# Patient Record
Sex: FEMALE | Race: WHITE | NOT HISPANIC OR LATINO | Employment: OTHER | URBAN - METROPOLITAN AREA
[De-identification: names, ages, dates, MRNs, and addresses within clinical notes are randomized per-mention and may not be internally consistent; named-entity substitution may affect disease eponyms.]

---

## 2017-01-10 ENCOUNTER — HOSPITAL ENCOUNTER (OUTPATIENT)
Dept: URGENT CARE | Facility: CLINIC | Age: 77
Discharge: HOME | End: 2017-01-10
Attending: FAMILY MEDICINE | Admitting: FAMILY MEDICINE

## 2017-05-23 ENCOUNTER — HOSPITAL ENCOUNTER (OUTPATIENT)
Dept: MRI IMAGING | Facility: HOSPITAL | Age: 77
Discharge: HOME | End: 2017-05-23
Attending: PODIATRIST | Admitting: PODIATRIST

## 2017-06-26 ENCOUNTER — HOSPITAL ENCOUNTER (OUTPATIENT)
Dept: MRI IMAGING | Facility: HOSPITAL | Age: 77
Discharge: HOME | End: 2017-06-26
Attending: PODIATRIST | Admitting: PODIATRIST

## 2017-12-04 ENCOUNTER — HOSPITAL ENCOUNTER (OUTPATIENT)
Dept: PHYSICAL THERAPY | Facility: HOSPITAL | Age: 77
Setting detail: RECURRING SERIES
Discharge: HOME | End: 2017-12-31
Attending: ORTHOPAEDIC SURGERY | Admitting: ORTHOPAEDIC SURGERY

## 2018-01-01 ENCOUNTER — HOSPITAL ENCOUNTER (OUTPATIENT)
Dept: PHYSICAL THERAPY | Facility: HOSPITAL | Age: 78
Setting detail: RECURRING SERIES
Discharge: HOME | End: 2018-03-06
Attending: ORTHOPAEDIC SURGERY | Admitting: ORTHOPAEDIC SURGERY

## 2018-06-15 ENCOUNTER — HOSPITAL ENCOUNTER (OUTPATIENT)
Dept: PREADMISSION TESTING | Facility: HOSPITAL | Age: 78
Discharge: HOME | End: 2018-06-15
Attending: ORTHOPAEDIC SURGERY | Admitting: ORTHOPAEDIC SURGERY

## 2018-06-15 LAB
ALBUMIN SERPL-MCNC: 4 G/DL (ref 3.5–4.8)
ALBUMIN/GLOB SERPL: 1.3 {RATIO} (ref 1–1.7)
ALP SERPL-CCNC: 79 IU/L (ref 32–91)
ALT SERPL-CCNC: 30 IU/L (ref 14–54)
ANION GAP SERPL CALC-SCNC: 13.7 MMOL/L (ref 10–20)
AST SERPL-CCNC: 30 IU/L (ref 15–41)
BASOPHILS # BLD AUTO: 0.1 10*3/UL (ref 0–0.2)
BASOPHILS NFR BLD AUTO: 1 % (ref 0–2)
BILIRUB SERPL-MCNC: 0.7 MG/DL (ref 0.3–1.2)
BILIRUB UR QL STRIP: NEGATIVE MG/DL
BUN SERPL-MCNC: 10 MG/DL (ref 8–20)
BUN/CREAT SERPL: 10 (ref 5.4–26.2)
CALCIUM SERPL-MCNC: 9.8 MG/DL (ref 8.9–10.3)
CASTS URNS QL MICRO: NORMAL /[LPF]
CHLORIDE SERPL-SCNC: 99 MMOL/L (ref 101–111)
COLOR UR: YELLOW
CONV BACTERIA IN URINE MICRO: NEGATIVE
CONV CLARITY OF URINE: CLEAR
CONV CO2: 28 MMOL/L (ref 22–32)
CONV HYALINE CASTS IN URINE MICRO: 0 /[LPF] (ref 0–5)
CONV PROTEIN IN URINE BY AUTOMATED TEST STRIP: NEGATIVE MG/DL
CONV SMALL ROUND CELLS: NORMAL /[HPF]
CONV TOTAL PROTEIN: 7.2 G/DL (ref 6.1–7.9)
CONV UROBILINOGEN IN URINE BY AUTOMATED TEST STRIP: 0.2 MG/DL
CREAT UR-MCNC: 1 MG/DL (ref 0.4–1)
CULTURE INDICATED?: NORMAL
DIFFERENTIAL METHOD BLD: (no result)
EOSINOPHIL # BLD AUTO: 0.2 10*3/UL (ref 0–0.3)
EOSINOPHIL # BLD AUTO: 3 % (ref 0–3)
ERYTHROCYTE [DISTWIDTH] IN BLOOD BY AUTOMATED COUNT: 15.2 % (ref 11.5–14.5)
GLOBULIN UR ELPH-MCNC: 3.2 G/DL (ref 2.5–3.8)
GLUCOSE SERPL-MCNC: 118 MG/DL (ref 65–99)
GLUCOSE UR QL: NEGATIVE MG/DL
HCT VFR BLD AUTO: 35.5 % (ref 35–49)
HGB BLD-MCNC: 11.7 G/DL (ref 12–15)
HGB UR QL STRIP: NEGATIVE
KETONES UR QL STRIP: NEGATIVE MG/DL
LEUKOCYTE ESTERASE UR QL STRIP: NEGATIVE
LYMPHOCYTES # BLD AUTO: 1.6 10*3/UL (ref 0.8–4.8)
LYMPHOCYTES NFR BLD AUTO: 20 % (ref 18–42)
MCH RBC QN AUTO: 29.4 PG (ref 26–32)
MCHC RBC AUTO-ENTMCNC: 32.9 G/DL (ref 32–36)
MCV RBC AUTO: 89.4 FL (ref 80–94)
MONOCYTES # BLD AUTO: 0.5 10*3/UL (ref 0.1–1.3)
MONOCYTES NFR BLD AUTO: 6 % (ref 2–11)
NEUTROPHILS # BLD AUTO: 5.6 10*3/UL (ref 2.3–8.6)
NEUTROPHILS NFR BLD AUTO: 70 % (ref 50–75)
NITRITE UR QL STRIP: NEGATIVE
NRBC BLD AUTO-RTO: 0 /100{WBCS}
NRBC/RBC NFR BLD MANUAL: 0 10*3/UL
PH UR STRIP.AUTO: 6.5 [PH] (ref 4.5–8)
PLATELET # BLD AUTO: 313 10*3/UL (ref 150–450)
PMV BLD AUTO: 8.1 FL (ref 7.4–10.4)
POTASSIUM SERPL-SCNC: 3.7 MMOL/L (ref 3.6–5.1)
RBC # BLD AUTO: 3.98 10*6/UL (ref 4–5.4)
RBC #/AREA URNS HPF: 1 /[HPF] (ref 0–3)
SODIUM SERPL-SCNC: 137 MMOL/L (ref 136–144)
SP GR UR: 1.01 (ref 1–1.03)
SPERM URNS QL MICRO: NORMAL /[HPF]
SQUAMOUS SPT QL MICRO: 1 /[HPF] (ref 0–5)
UNIDENT CRYS URNS QL MICRO: NORMAL /[HPF]
WBC # BLD AUTO: 8 10*3/UL (ref 4.5–11.5)
WBC #/AREA URNS HPF: 1 /[HPF] (ref 0–5)
YEAST SPEC QL WET PREP: NORMAL /[HPF]

## 2019-03-19 ENCOUNTER — HOSPITAL ENCOUNTER (OUTPATIENT)
Dept: LAB | Facility: HOSPITAL | Age: 79
Discharge: HOME | End: 2019-03-19
Attending: INTERNAL MEDICINE | Admitting: INTERNAL MEDICINE

## 2019-03-19 LAB
ANION GAP SERPL CALC-SCNC: 15.4 MMOL/L (ref 10–20)
BILIRUB UR QL STRIP: NEGATIVE MG/DL
BUN SERPL-MCNC: 14 MG/DL (ref 8–20)
BUN/CREAT SERPL: 15.6 (ref 5.4–26.2)
CALCIUM SERPL-MCNC: 9.6 MG/DL (ref 8.9–10.3)
CASTS URNS QL MICRO: NORMAL /[LPF]
CHLORIDE SERPL-SCNC: 103 MMOL/L (ref 101–111)
COLOR UR: YELLOW
CONV BACTERIA IN URINE MICRO: NEGATIVE
CONV CLARITY OF URINE: CLEAR
CONV CO2: 24 MMOL/L (ref 22–32)
CONV HYALINE CASTS IN URINE MICRO: 0 /[LPF] (ref 0–5)
CONV PROTEIN IN URINE BY AUTOMATED TEST STRIP: NEGATIVE MG/DL
CONV SMALL ROUND CELLS: NORMAL /[HPF]
CONV UROBILINOGEN IN URINE BY AUTOMATED TEST STRIP: 0.2 MG/DL
CREAT UR-MCNC: 0.9 MG/DL (ref 0.4–1)
CULTURE INDICATED?: NORMAL
GLUCOSE SERPL-MCNC: 94 MG/DL (ref 65–99)
GLUCOSE UR QL: NEGATIVE MG/DL
HGB UR QL STRIP: NEGATIVE
KETONES UR QL STRIP: NEGATIVE MG/DL
LEUKOCYTE ESTERASE UR QL STRIP: NEGATIVE
NITRITE UR QL STRIP: NEGATIVE
PH UR STRIP.AUTO: 6 [PH] (ref 4.5–8)
POTASSIUM SERPL-SCNC: 3.4 MMOL/L (ref 3.6–5.1)
RBC #/AREA URNS HPF: 0 /[HPF] (ref 0–3)
SODIUM SERPL-SCNC: 139 MMOL/L (ref 136–144)
SP GR UR: 1.01 (ref 1–1.03)
SPERM URNS QL MICRO: NORMAL /[HPF]
SQUAMOUS SPT QL MICRO: 0 /[HPF] (ref 0–5)
UNIDENT CRYS URNS QL MICRO: NORMAL /[HPF]
URATE SERPL-MCNC: 8.7 MG/DL (ref 2.6–8)
WBC #/AREA URNS HPF: 1 /[HPF] (ref 0–5)
YEAST SPEC QL WET PREP: NORMAL /[HPF]

## 2021-01-04 ENCOUNTER — TELEPHONE (OUTPATIENT)
Dept: BARIATRICS/WEIGHT MGMT | Facility: CLINIC | Age: 81
End: 2021-01-04

## 2021-01-04 NOTE — TELEPHONE ENCOUNTER
Perla called asking about weight loss program, she didn't want surgery and she says her insurance covers a weight loss program. I explained HMR is not covered under insurance due to you have to buy food, and that we no longer have the HMR program. She could order on line or go to [facility].

## 2021-05-25 ENCOUNTER — OFFICE VISIT (OUTPATIENT)
Dept: SURGERY | Facility: CLINIC | Age: 81
End: 2021-05-25

## 2021-05-25 VITALS — BODY MASS INDEX: 44.02 KG/M2 | HEIGHT: 62 IN | WEIGHT: 239.2 LBS

## 2021-05-25 DIAGNOSIS — R13.19 ESOPHAGEAL DYSPHAGIA: ICD-10-CM

## 2021-05-25 DIAGNOSIS — R10.11 RIGHT UPPER QUADRANT ABDOMINAL PAIN: Primary | ICD-10-CM

## 2021-05-25 PROCEDURE — 99203 OFFICE O/P NEW LOW 30 MIN: CPT | Performed by: SURGERY

## 2021-05-25 RX ORDER — FAMOTIDINE 20 MG/1
20 TABLET, FILM COATED ORAL 2 TIMES DAILY
COMMUNITY

## 2021-05-25 RX ORDER — METOPROLOL SUCCINATE 50 MG/1
50 TABLET, EXTENDED RELEASE ORAL DAILY
COMMUNITY

## 2021-05-25 RX ORDER — AMLODIPINE BESYLATE 5 MG/1
TABLET ORAL
COMMUNITY
Start: 2021-04-25

## 2021-05-25 RX ORDER — POTASSIUM CHLORIDE 750 MG/1
10 TABLET, FILM COATED, EXTENDED RELEASE ORAL 2 TIMES DAILY
COMMUNITY

## 2021-05-25 RX ORDER — HYDROCHLOROTHIAZIDE 50 MG/1
TABLET ORAL
COMMUNITY
Start: 2020-11-30

## 2021-05-25 RX ORDER — ALBUTEROL SULFATE 90 UG/1
2 AEROSOL, METERED RESPIRATORY (INHALATION)
COMMUNITY
Start: 2021-04-06

## 2021-05-25 RX ORDER — EZETIMIBE 10 MG/1
10 TABLET ORAL DAILY
COMMUNITY
Start: 2021-03-11

## 2021-05-25 NOTE — PROGRESS NOTES
Subjective   Perla Wilder is a 80 y.o. female who presents to the office in surgical consultation from Tito Brito MD for right upper quadrant abdominal pain and dysphagia.    History of Present Illness     The patient had a laparoscopic cholecystectomy back in 2015 and recovered well after that procedure.  Over the past 3 weeks she has developed right upper quadrant abdominal pain that she describes as a spasm.  The pain is present when she gets in certain positions or when she coughs.  She has no symptoms related to eating or bowel function.  The pain is intermittent.  When she changes her position or stop coughing, the pain goes away.    She also has increasing problems with dysphagia.  She states that it feels like her throat is very dry and if she swallows anything that does not have moisture, it gets stuck.  She will often wait for short period of time and it will pass and then she will drink fluids.  Her symptoms are not as bad when she drinks plenty of fluid with dry food.  She does not have any problem with meat getting stuck.    She has also been having problems with sinus type drainage and coughing.  She has seen her primary care physician and ENT who did not think it was purely from allergies.  There is suspicion that she may have reflux contributing to her symptoms.  She does not have heartburn or obvious regurgitation.  She takes Pepcid on a regular basis.    Review of Systems   Constitutional: Negative for fatigue and fever.   HENT: Positive for trouble swallowing. Negative for voice change.    Respiratory: Negative for chest tightness and shortness of breath.    Cardiovascular: Negative for chest pain and palpitations.   Gastrointestinal: Positive for abdominal pain. Negative for blood in stool, constipation, diarrhea, nausea and vomiting.   Psychiatric/Behavioral: Negative for agitation and confusion.     Past Medical History:   Diagnosis Date   • Anemia    • Bladder cancer (CMS/HCC)    •  Cancer (CMS/HCC)     breast cancer   • Cancer (CMS/HCC)     bladder cancer   • Chronic kidney disease    • Gout    • Hearing loss    • Hypertension    • Sleep apnea    • Thyroid disease      Past Surgical History:   Procedure Laterality Date   • APPENDECTOMY     • BLADDER SURGERY     • BREAST SURGERY Right     lumpectomy   • ENDOSCOPY N/A 7/1/2016    Procedure: ESOPHAGOGASTRODUODENOSCOPY;  Surgeon: Axel Silver Jr., MD;  Location: [facility] ENDOSCOPY;  Service:    • GALLBLADDER SURGERY     • HERNIA REPAIR     • HYSTERECTOMY     • TOE PERCUTANEOUS PINNING Left    • TONSILLECTOMY AND ADENOIDECTOMY       Family History   Problem Relation Age of Onset   • Cancer Mother         PANCREATIC   • Cancer Father         NECK AREA   • Hypertension Sister    • Cancer Brother    • Hypertension Brother      Social History     Socioeconomic History   • Marital status:      Spouse name: Not on file   • Number of children: Not on file   • Years of education: Not on file   • Highest education level: Not on file   Tobacco Use   • Smoking status: Never Smoker   • Smokeless tobacco: Never Used   Vaping Use   • Vaping Use: Never used   Substance and Sexual Activity   • Alcohol use: No   • Drug use: No   • Sexual activity: Defer       Objective   Physical Exam  Constitutional:       Appearance: Normal appearance. She is well-developed. She is not toxic-appearing.   Eyes:      General: No scleral icterus.  Pulmonary:      Effort: Pulmonary effort is normal. No respiratory distress.   Abdominal:      Palpations: Abdomen is soft.      Tenderness: There is no abdominal tenderness.   Skin:     General: Skin is warm and dry.   Neurological:      Mental Status: She is alert and oriented to person, place, and time.   Psychiatric:         Behavior: Behavior normal.         Thought Content: Thought content normal.         Judgment: Judgment normal.         Assessment/Plan       The primary encounter diagnosis was Right upper quadrant abdominal  pain. A diagnosis of Esophageal dysphagia was also pertinent to this visit.    The patient has intermittent right upper quadrant abdominal pain that seems musculoskeletal in nature.  It is worse with certain positions or with coughing.  They concept of a muscle spasm was discussed with her and she was advised on conservative measures to alleviate the symptoms.    The patient has dysphagia that is highly suspicious for an underlying esophageal dysmotility.  She was advised that she will need a gastroenterology consultation to perform the EGD and potentially manage the underlying dysmotility.  They can also address symptoms of reflux if they are present.

## 2021-05-28 ENCOUNTER — OFFICE VISIT (OUTPATIENT)
Dept: BARIATRICS/WEIGHT MGMT | Facility: CLINIC | Age: 81
End: 2021-05-28

## 2021-05-28 DIAGNOSIS — E66.01 OBESITY, CLASS III, BMI 40-49.9 (MORBID OBESITY) (HCC): ICD-10-CM

## 2021-05-28 PROCEDURE — G0270 MNT SUBS TX FOR CHANGE DX: HCPCS | Performed by: DIETITIAN, REGISTERED

## 2021-05-28 NOTE — PROGRESS NOTES
Chief Complaint   Patient presents with   • Obesity   • Nutrition Counseling       The patient is here for weight loss visit.  Pt has berries, green beans, cauliflower and apples at home.  Pt has tried WW but tired of keeping points.     24 hour recall: wakes up at 6 am  Breakfast: smoothies, yogurt parfait, or egg, sausage biscuit  Lunch: lunch meat sandwich or hamburger with bag of chips  Supper: turkey meatballs, salad and already cutting starches at supper  Snacks:nuts, peppers  Beverages: water, coffee, sweetened ice tea or Sprite if out; no etoh      PA: house work; had hip and knee pain    Vitals:        BP: NA   Pulse: NA      Patient Active Problem List   Diagnosis   • Obesity          Body mass index is 43.75 kg/m².  Vitals       Documented weight        Weight: 239.2 pounds        Discussion/Plan:  Obesity/Morbid Obesity: I reviewed the appropriate dietary choices with the patient and encouraged the necessary changes. Recommended at least 1200 calories per day. Instructed patient to drink 64 ounces of water per day and exercise a minimum of 150 minutes per week.  Discussed the option of keeping a food journal which will help patient become more aware of the nutritional value of foods.     The patient was given written materials from our office for education.     I answered all of the patients questions regarding dietary changes, & exercise.    The patient will follow up on 7/2/21 at 12:30 pm     PES: Food and nutrition related knowledge deficit RT uncertainty how to apply nutrition information AEB needed education for guidelines for bariatric surgery.       The total time spent face to face was 15 minutes with 15 minutes spent counseling.         GOALS:   1.) Increase fruits and vegetables to 1/2 plate every lunch and dinner and keep starches to 1/2 cup or 1/4 plate.   2.) Join [employer] this summer and do water exercise 2-3 days per week.  3.) Walk in house during TV commercials.   4.) Use [employer]  for food and physical activity tracking and don't go below 1200 calories per day.  5.) weigh on home scale and it's okay to lose 0.5 to one pound per week  6.) short term weight loss goal of 10 pounds by mid August  7.) follow up visit is Friday, July 2nd at 12:30 pm

## 2021-07-02 ENCOUNTER — OFFICE VISIT (OUTPATIENT)
Dept: BARIATRICS/WEIGHT MGMT | Facility: CLINIC | Age: 81
End: 2021-07-02

## 2021-07-02 VITALS — BODY MASS INDEX: 43.71 KG/M2 | WEIGHT: 239 LBS

## 2021-07-02 DIAGNOSIS — E66.01 OBESITY, CLASS III, BMI 40-49.9 (MORBID OBESITY) (HCC): ICD-10-CM

## 2021-07-02 NOTE — PROGRESS NOTES
The patient is here for supervised weight loss visit. Has partially met following goals: 1) pt states she following healthy portion sizes most of the time; 2.) Pt joined the [employer]; 3.) pt  Walking during TV commercials; 4.) pt tried using [employer] but is too time consuming; 5.) did not weigh during vacation; 6.) not yet; 7.) present  PA:      24 hr recall:  Breakfast: smoothie or breakfast sandwich  Lunch: wraps instead of bread or salad with lite dressing  Dinner: meat, salad and vegetable  Snacks: nuts and peppers  Drinks: water, coffee, less sweetened ice tea     Review of Systems   HR NA    BP NA   Patient Active Problem List   Diagnosis   • Obesity         Vitals   Wt: 239 pounds     Weight change from last appointment: - 0.2 lb     Discussion/Plan:  Obesity/Morbid Obesity: I reviewed the appropriate dietary choices with the patient and encouraged the necessary changes. Recommended at least 60 grams of protein per day, around 33 grams of fats and less than 100 grams of carbohydrates. Reviewed calorie intake if patient wanted to calorie count and/or had BMR. Instructed patient to drink 64 ounces of water per day and exercise a minimum of 150 minutes per week including both cardio and strength training.  Discussed the option of keeping a food journal which will help patient become more aware of the nutritional value of foods so they are more prepared after surgery.     The patient was given written materials from our office for education.     I answered all of the patients questions regarding dietary changes & exercise.      The patient will follow up on 9/3/21.     PES: Food and nutrition related knowledge deficit RT uncertainty how to apply nutrition information AEB needed education for guidelines for weight loss.      The total time spent face to face was 10 minutes with 10 minutes spent counseling.  Put in a no charge since spending less than 15 minutes.         Goals:  1.) Do water exercise 4 days per week.  2.) Lose  2 to 4 pounds  3.) add more fruits/vegetables to meals/snacks

## 2021-09-03 ENCOUNTER — OFFICE VISIT (OUTPATIENT)
Dept: BARIATRICS/WEIGHT MGMT | Facility: CLINIC | Age: 81
End: 2021-09-03

## 2021-09-03 VITALS — BODY MASS INDEX: 42.84 KG/M2 | WEIGHT: 234.2 LBS

## 2021-09-03 DIAGNOSIS — E66.01 OBESITY, CLASS III, BMI 40-49.9 (MORBID OBESITY) (HCC): ICD-10-CM

## 2021-09-03 PROCEDURE — G0270 MNT SUBS TX FOR CHANGE DX: HCPCS | Performed by: DIETITIAN, REGISTERED

## 2021-09-03 NOTE — PROGRESS NOTES
The patient is here for supervised weight loss visit. Has partially met following goals: 1) pt doing water exercises 5-6 days per week for 45-60 minutes; 2.) Lost 4.8 pounds; 3.) adding fruit to breakfast and snacks. Pt states she is watching her portion sizes    PA: water exercises 5-6 days per week for 45-60 minutes at the [facility]     24 hr recall:  Breakfast: smoothie or breakfast sandwich or yogurt parfait  Lunch: wraps instead of bread with tomatoes or salad with lite dressing  Dinner: meat, salad and vegetable  Snacks: nuts and peppers  Drinks: water, coffee and sweetened ice tea once a week     Review of Systems   HR NA    BP NA   Patient Active Problem List   Diagnosis   • Obesity         Vitals   Wt: 234.2 pounds     Weight change from last appointment: -4.8 lb     Discussion/Plan:  Obesity/Morbid Obesity: I reviewed the appropriate dietary choices with the patient and encouraged the necessary changes. Reviewed calorie intake if patient wanted to calorie count and/or had BMR. Instructed patient to drink 64 ounces of water per day and exercise a minimum of 150 minutes per week including both cardio and strength training.  Discussed with pt also eating and drinking separately stopping 30 minutes before meals and 45 minutes after meals. Discussed the option of keeping a food journal which will help patient become more aware of the nutritional value of foods so they are more prepared after surgery.     The patient was given written materials from our office for education.     I answered all of the patients questions regarding dietary changes, exercise or surgical options.     The patient will follow up in 2 months on 11/12/21 at 8 am.     PES: Food and nutrition related knowledge deficit RT uncertainty how to apply nutrition information AEB needed education for guidelines for weight management.      The total time spent face to face was 15 minutes with 15 minutes spent counseling.          Goals:  1.) Lose 2-4  pounds  2.) Continue to increase vegetable intake  3.) Keep starches to 1/2 cup  4.) Continue to weigh to once a week

## 2022-03-17 ENCOUNTER — LAB (OUTPATIENT)
Dept: LAB | Facility: HOSPITAL | Age: 82
End: 2022-03-17

## 2022-03-17 ENCOUNTER — TRANSCRIBE ORDERS (OUTPATIENT)
Dept: ADMINISTRATIVE | Facility: HOSPITAL | Age: 82
End: 2022-03-17

## 2022-03-17 ENCOUNTER — HOSPITAL ENCOUNTER (OUTPATIENT)
Dept: GENERAL RADIOLOGY | Facility: HOSPITAL | Age: 82
Discharge: HOME | End: 2022-03-17

## 2022-03-17 ENCOUNTER — HOSPITAL ENCOUNTER (OUTPATIENT)
Dept: CARDIOLOGY | Facility: HOSPITAL | Age: 82
Discharge: HOME | End: 2022-03-17

## 2022-03-17 DIAGNOSIS — Z01.818 PREOP TESTING: ICD-10-CM

## 2022-03-17 DIAGNOSIS — Z01.818 PREOP TESTING: Primary | ICD-10-CM

## 2022-03-17 LAB
ALBUMIN SERPL-MCNC: 4.7 G/DL (ref 3.5–5.2)
ALBUMIN/GLOB SERPL: 1.7 G/DL
ALP SERPL-CCNC: 88 U/L (ref 39–117)
ALT SERPL W P-5'-P-CCNC: 37 U/L (ref 1–33)
ANION GAP SERPL CALCULATED.3IONS-SCNC: 15.6 MMOL/L (ref 5–15)
AST SERPL-CCNC: 36 U/L (ref 1–32)
BACTERIA UR QL AUTO: ABNORMAL /HPF
BASOPHILS # BLD AUTO: 0.04 10*3/MM3 (ref 0–0.2)
BASOPHILS NFR BLD AUTO: 0.4 % (ref 0–1.5)
BILIRUB SERPL-MCNC: 0.5 MG/DL (ref 0–1.2)
BILIRUB UR QL STRIP: NEGATIVE
BUN SERPL-MCNC: 18 MG/DL (ref 8–23)
BUN/CREAT SERPL: 13.4 (ref 7–25)
CALCIUM SPEC-SCNC: 10.3 MG/DL (ref 8.6–10.5)
CHLORIDE SERPL-SCNC: 96 MMOL/L (ref 98–107)
CLARITY UR: CLEAR
CO2 SERPL-SCNC: 26.4 MMOL/L (ref 22–29)
COLOR UR: YELLOW
CREAT SERPL-MCNC: 1.34 MG/DL (ref 0.57–1)
DEPRECATED RDW RBC AUTO: 50.4 FL (ref 37–54)
EGFRCR SERPLBLD CKD-EPI 2021: 40.4 ML/MIN/1.73
EOSINOPHIL # BLD AUTO: 0.21 10*3/MM3 (ref 0–0.4)
EOSINOPHIL NFR BLD AUTO: 2.1 % (ref 0.3–6.2)
ERYTHROCYTE [DISTWIDTH] IN BLOOD BY AUTOMATED COUNT: 14.9 % (ref 12.3–15.4)
GLOBULIN UR ELPH-MCNC: 2.7 GM/DL
GLUCOSE SERPL-MCNC: 106 MG/DL (ref 65–99)
GLUCOSE UR STRIP-MCNC: NEGATIVE MG/DL
HCT VFR BLD AUTO: 36.7 % (ref 34–46.6)
HGB BLD-MCNC: 12 G/DL (ref 12–15.9)
HGB UR QL STRIP.AUTO: NEGATIVE
HYALINE CASTS UR QL AUTO: ABNORMAL /LPF
IMM GRANULOCYTES # BLD AUTO: 0.02 10*3/MM3 (ref 0–0.05)
IMM GRANULOCYTES NFR BLD AUTO: 0.2 % (ref 0–0.5)
KETONES UR QL STRIP: NEGATIVE
LEUKOCYTE ESTERASE UR QL STRIP.AUTO: ABNORMAL
LYMPHOCYTES # BLD AUTO: 1.67 10*3/MM3 (ref 0.7–3.1)
LYMPHOCYTES NFR BLD AUTO: 16.6 % (ref 19.6–45.3)
MCH RBC QN AUTO: 30 PG (ref 26.6–33)
MCHC RBC AUTO-ENTMCNC: 32.7 G/DL (ref 31.5–35.7)
MCV RBC AUTO: 91.8 FL (ref 79–97)
MONOCYTES # BLD AUTO: 0.63 10*3/MM3 (ref 0.1–0.9)
MONOCYTES NFR BLD AUTO: 6.3 % (ref 5–12)
NEUTROPHILS NFR BLD AUTO: 7.51 10*3/MM3 (ref 1.7–7)
NEUTROPHILS NFR BLD AUTO: 74.4 % (ref 42.7–76)
NITRITE UR QL STRIP: NEGATIVE
NRBC BLD AUTO-RTO: 0 /100 WBC (ref 0–0.2)
PH UR STRIP.AUTO: 6.5 [PH] (ref 5–8)
PLATELET # BLD AUTO: 319 10*3/MM3 (ref 140–450)
PMV BLD AUTO: 10.9 FL (ref 6–12)
POTASSIUM SERPL-SCNC: 3.8 MMOL/L (ref 3.5–5.2)
PROT SERPL-MCNC: 7.4 G/DL (ref 6–8.5)
PROT UR QL STRIP: ABNORMAL
QT INTERVAL: 445 MS
RBC # BLD AUTO: 4 10*6/MM3 (ref 3.77–5.28)
RBC # UR STRIP: ABNORMAL /HPF
REF LAB TEST METHOD: ABNORMAL
SODIUM SERPL-SCNC: 138 MMOL/L (ref 136–145)
SP GR UR STRIP: 1.01 (ref 1–1.03)
SQUAMOUS #/AREA URNS HPF: ABNORMAL /HPF
UROBILINOGEN UR QL STRIP: ABNORMAL
WBC # UR STRIP: ABNORMAL /HPF
WBC NRBC COR # BLD: 10.08 10*3/MM3 (ref 3.4–10.8)

## 2022-03-17 PROCEDURE — 36415 COLL VENOUS BLD VENIPUNCTURE: CPT

## 2022-03-17 PROCEDURE — 80053 COMPREHEN METABOLIC PANEL: CPT

## 2022-03-17 PROCEDURE — 87186 SC STD MICRODIL/AGAR DIL: CPT

## 2022-03-17 PROCEDURE — 81001 URINALYSIS AUTO W/SCOPE: CPT

## 2022-03-17 PROCEDURE — 87088 URINE BACTERIA CULTURE: CPT

## 2022-03-17 PROCEDURE — 93010 ELECTROCARDIOGRAM REPORT: CPT | Performed by: INTERNAL MEDICINE

## 2022-03-17 PROCEDURE — 71046 X-RAY EXAM CHEST 2 VIEWS: CPT

## 2022-03-17 PROCEDURE — 87086 URINE CULTURE/COLONY COUNT: CPT

## 2022-03-17 PROCEDURE — 85025 COMPLETE CBC W/AUTO DIFF WBC: CPT

## 2022-03-17 PROCEDURE — 93005 ELECTROCARDIOGRAM TRACING: CPT | Performed by: ORTHOPAEDIC SURGERY

## 2022-03-19 LAB — BACTERIA SPEC AEROBE CULT: ABNORMAL

## 2022-09-10 ENCOUNTER — HOSPITAL ENCOUNTER (EMERGENCY)
Facility: HOSPITAL | Age: 82
Discharge: HOME | End: 2022-09-10
Attending: EMERGENCY MEDICINE | Admitting: EMERGENCY MEDICINE

## 2022-09-10 ENCOUNTER — APPOINTMENT (OUTPATIENT)
Dept: CT IMAGING | Facility: HOSPITAL | Age: 82
End: 2022-09-10

## 2022-09-10 VITALS
RESPIRATION RATE: 16 BRPM | OXYGEN SATURATION: 97 % | WEIGHT: 234.79 LBS | HEIGHT: 62 IN | BODY MASS INDEX: 43.21 KG/M2 | DIASTOLIC BLOOD PRESSURE: 62 MMHG | SYSTOLIC BLOOD PRESSURE: 159 MMHG | HEART RATE: 52 BPM | TEMPERATURE: 98 F

## 2022-09-10 DIAGNOSIS — R51.9 NONINTRACTABLE HEADACHE, UNSPECIFIED CHRONICITY PATTERN, UNSPECIFIED HEADACHE TYPE: Primary | ICD-10-CM

## 2022-09-10 LAB
ANION GAP SERPL CALCULATED.3IONS-SCNC: 14 MMOL/L (ref 5–15)
BASOPHILS # BLD AUTO: 0.1 10*3/MM3 (ref 0–0.2)
BASOPHILS NFR BLD AUTO: 1.1 % (ref 0–1.5)
BUN SERPL-MCNC: 15 MG/DL (ref 8–23)
BUN/CREAT SERPL: 16.3 (ref 7–25)
CALCIUM SPEC-SCNC: 9.7 MG/DL (ref 8.6–10.5)
CHLORIDE SERPL-SCNC: 97 MMOL/L (ref 98–107)
CO2 SERPL-SCNC: 28 MMOL/L (ref 22–29)
CREAT SERPL-MCNC: 0.92 MG/DL (ref 0.57–1)
CRP SERPL-MCNC: 0.48 MG/DL (ref 0–0.5)
DEPRECATED RDW RBC AUTO: 47.3 FL (ref 37–54)
EGFRCR SERPLBLD CKD-EPI 2021: 63.1 ML/MIN/1.73
EOSINOPHIL # BLD AUTO: 0.2 10*3/MM3 (ref 0–0.4)
EOSINOPHIL NFR BLD AUTO: 2.4 % (ref 0.3–6.2)
ERYTHROCYTE [DISTWIDTH] IN BLOOD BY AUTOMATED COUNT: 15.5 % (ref 12.3–15.4)
ERYTHROCYTE [SEDIMENTATION RATE] IN BLOOD: 46 MM/HR (ref 0–30)
GLUCOSE SERPL-MCNC: 135 MG/DL (ref 65–99)
HCT VFR BLD AUTO: 32.3 % (ref 34–46.6)
HGB BLD-MCNC: 10.7 G/DL (ref 12–15.9)
LYMPHOCYTES # BLD AUTO: 1.5 10*3/MM3 (ref 0.7–3.1)
LYMPHOCYTES NFR BLD AUTO: 16.4 % (ref 19.6–45.3)
MCH RBC QN AUTO: 28.9 PG (ref 26.6–33)
MCHC RBC AUTO-ENTMCNC: 33.2 G/DL (ref 31.5–35.7)
MCV RBC AUTO: 87.2 FL (ref 79–97)
MONOCYTES # BLD AUTO: 0.8 10*3/MM3 (ref 0.1–0.9)
MONOCYTES NFR BLD AUTO: 8.8 % (ref 5–12)
NEUTROPHILS NFR BLD AUTO: 6.4 10*3/MM3 (ref 1.7–7)
NEUTROPHILS NFR BLD AUTO: 71.3 % (ref 42.7–76)
NRBC BLD AUTO-RTO: 0 /100 WBC (ref 0–0.2)
PLATELET # BLD AUTO: 318 10*3/MM3 (ref 140–450)
PMV BLD AUTO: 8 FL (ref 6–12)
POTASSIUM SERPL-SCNC: 3.3 MMOL/L (ref 3.5–5.2)
RBC # BLD AUTO: 3.71 10*6/MM3 (ref 3.77–5.28)
SODIUM SERPL-SCNC: 139 MMOL/L (ref 136–145)
WBC NRBC COR # BLD: 9 10*3/MM3 (ref 3.4–10.8)

## 2022-09-10 PROCEDURE — 96374 THER/PROPH/DIAG INJ IV PUSH: CPT

## 2022-09-10 PROCEDURE — 85025 COMPLETE CBC W/AUTO DIFF WBC: CPT | Performed by: EMERGENCY MEDICINE

## 2022-09-10 PROCEDURE — 99283 EMERGENCY DEPT VISIT LOW MDM: CPT

## 2022-09-10 PROCEDURE — 25010000002 ONDANSETRON PER 1 MG: Performed by: EMERGENCY MEDICINE

## 2022-09-10 PROCEDURE — 70450 CT HEAD/BRAIN W/O DYE: CPT

## 2022-09-10 PROCEDURE — 80048 BASIC METABOLIC PNL TOTAL CA: CPT | Performed by: EMERGENCY MEDICINE

## 2022-09-10 PROCEDURE — 25010000002 HYDROMORPHONE 1 MG/ML SOLUTION: Performed by: EMERGENCY MEDICINE

## 2022-09-10 PROCEDURE — 86140 C-REACTIVE PROTEIN: CPT | Performed by: EMERGENCY MEDICINE

## 2022-09-10 PROCEDURE — 25010000002 PROCHLORPERAZINE 10 MG/2ML SOLUTION: Performed by: EMERGENCY MEDICINE

## 2022-09-10 PROCEDURE — 85652 RBC SED RATE AUTOMATED: CPT | Performed by: EMERGENCY MEDICINE

## 2022-09-10 PROCEDURE — 25010000002 DIPHENHYDRAMINE PER 50 MG: Performed by: EMERGENCY MEDICINE

## 2022-09-10 PROCEDURE — 96375 TX/PRO/DX INJ NEW DRUG ADDON: CPT

## 2022-09-10 RX ORDER — ONDANSETRON 2 MG/ML
4 INJECTION INTRAMUSCULAR; INTRAVENOUS ONCE
Status: COMPLETED | OUTPATIENT
Start: 2022-09-10 | End: 2022-09-10

## 2022-09-10 RX ORDER — DIPHENHYDRAMINE HYDROCHLORIDE 50 MG/ML
25 INJECTION INTRAMUSCULAR; INTRAVENOUS ONCE
Status: COMPLETED | OUTPATIENT
Start: 2022-09-10 | End: 2022-09-10

## 2022-09-10 RX ORDER — SODIUM CHLORIDE 0.9 % (FLUSH) 0.9 %
10 SYRINGE (ML) INJECTION AS NEEDED
Status: DISCONTINUED | OUTPATIENT
Start: 2022-09-10 | End: 2022-09-10 | Stop reason: HOSPADM

## 2022-09-10 RX ORDER — PROCHLORPERAZINE EDISYLATE 5 MG/ML
10 INJECTION INTRAMUSCULAR; INTRAVENOUS ONCE
Status: COMPLETED | OUTPATIENT
Start: 2022-09-10 | End: 2022-09-10

## 2022-09-10 RX ADMIN — ONDANSETRON 4 MG: 2 INJECTION INTRAMUSCULAR; INTRAVENOUS at 04:44

## 2022-09-10 RX ADMIN — HYDROMORPHONE HYDROCHLORIDE 0.5 MG: 1 INJECTION, SOLUTION INTRAMUSCULAR; INTRAVENOUS; SUBCUTANEOUS at 04:44

## 2022-09-10 RX ADMIN — DIPHENHYDRAMINE HYDROCHLORIDE 25 MG: 50 INJECTION INTRAMUSCULAR; INTRAVENOUS at 02:59

## 2022-09-10 RX ADMIN — PROCHLORPERAZINE EDISYLATE 10 MG: 5 INJECTION INTRAMUSCULAR; INTRAVENOUS at 03:00

## 2022-09-10 NOTE — ED PROVIDER NOTES
Subjective   PIT    History of Present Illness  Headache  82-year-old female describes headache with pain in the left posterior head over the last 2 days.  States it was intermittent and light intensity on Wednesday when it started and gradually increased in intensity and frequency and duration.  She denies any associated symptoms of fever or blurry vision or focal numbness or weakness.  She reports no trauma and reports no relieving or exacerbating factors.  Review of Systems   Constitutional: Negative.    Eyes: Negative.    Respiratory: Negative.    Cardiovascular: Negative.    Gastrointestinal: Negative.    Genitourinary: Negative.    Musculoskeletal: Negative.    Skin: Negative.    Neurological: Positive for headaches.   Psychiatric/Behavioral: Negative.        Past Medical History:   Diagnosis Date   • Anemia    • Bladder cancer (CMS/HCC)    • Cancer (CMS/HCC)     breast cancer   • Cancer (CMS/HCC)     bladder cancer   • Chronic kidney disease    • Gout    • Hearing loss    • Hypertension    • Sleep apnea    • Thyroid disease        Allergies   Allergen Reactions   • Ace Inhibitors Cough   • Biaxin [Clarithromycin] Other (See Comments)     Rapid heart rate   • Penicillins Rash       Past Surgical History:   Procedure Laterality Date   • APPENDECTOMY     • BLADDER SURGERY     • BREAST SURGERY Right     lumpectomy   • ENDOSCOPY N/A 7/1/2016    Procedure: ESOPHAGOGASTRODUODENOSCOPY;  Surgeon: Axel Silver Jr., MD;  Location: [facility] ENDOSCOPY;  Service:    • GALLBLADDER SURGERY     • HERNIA REPAIR     • HYSTERECTOMY     • TOE PERCUTANEOUS PINNING Left    • TONSILLECTOMY AND ADENOIDECTOMY         Family History   Problem Relation Age of Onset   • Cancer Mother         PANCREATIC   • Cancer Father         NECK AREA   • Hypertension Sister    • Cancer Brother    • Hypertension Brother        Social History     Socioeconomic History   • Marital status:    Tobacco Use   • Smoking status: Never Smoker   •  "Smokeless tobacco: Never Used   Vaping Use   • Vaping Use: Never used   Substance and Sexual Activity   • Alcohol use: No   • Drug use: No   • Sexual activity: Defer     Prior to Admission medications    Medication Sig Start Date End Date Taking? Authorizing Provider   albuterol sulfate  (90 Base) MCG/ACT inhaler Inhale 2 puffs. 4/6/21   Elisabeth Hernandez MD   amLODIPine (NORVASC) 5 MG tablet  4/25/21   Elisabeth Hernandez MD   aspirin 81 MG EC tablet Take 81 mg by mouth daily.    Elisabeth Hernandez MD   ezetimibe (ZETIA) 10 MG tablet Take 10 mg by mouth Daily. 3/11/21   Elisabeth Hernandez MD   famotidine (PEPCID) 20 MG tablet Take 20 mg by mouth 2 (Two) Times a Day.    Elisabeth Hernandez MD   hydroCHLOROthiazide (HYDRODIURIL) 50 MG tablet TAKE 1 TABLET DAILY (DOSE  ADJUSTMENT) 11/30/20   Elisabeth Hernandez MD   levothyroxine (SYNTHROID, LEVOTHROID) 50 MCG tablet Take  by mouth. 1/6/15   Elisabeth Hernandez MD   metoprolol succinate XL (TOPROL-XL) 50 MG 24 hr tablet Take 50 mg by mouth Daily.    Elisabeth Hernandez MD   Multiple Vitamin (MULTI-VITAMIN) tablet Take  by mouth. 5/6/13   Elisabeth Hernandez MD   potassium chloride 10 MEQ CR tablet Take 10 mEq by mouth 2 (Two) Times a Day.    Elisabeth Hernandez MD   Probiotic Product (PROBIOTIC PO) Take  by mouth.    Elisabeth Hernandez MD     BP (!) 184/74   Pulse 52   Temp 98 °F (36.7 °C) (Temporal)   Resp 16   Ht 157.5 cm (62\")   Wt 107 kg (234 lb 12.6 oz)   SpO2 92%   BMI 42.94 kg/m²   I examined the patient using the appropriate personal protective equipment.          Objective   Physical Exam  General: Well-developed well-appearing, no acute distress, alert and appropriate  Eyes: Pupils round and reactive, sclera nonicteric  HEENT: Mucous membranes moist, no mucosal swelling, scalp nontender, no soft tissue swelling,  Neck: Supple, no nuchal rigidity, no lymphadenopathy  Respirations: Respirations nonlabored, equal breath " sounds bilaterally, clear lungs  Heart regular rate and rhythm, no murmurs rubs or gallops,   Abdomen soft nontender nondistended, no hepatosplenomegaly, no hernia, no mass, normal bowel sounds, no CVA tenderness  Extremities no clubbing cyanosis or edema, calves are symmetric and nontender  Neuro cranial nerves II through XII intact , normal sensory/motor function and strength in four extremities, no slurred speech, no facial droop, no ataxia no nuchal rigidity  Psych oriented, pleasant affect  Skin no rash, brisk cap refill  Procedures           ED Course            Results for orders placed or performed during the hospital encounter of 09/10/22   Basic Metabolic Panel    Specimen: Blood   Result Value Ref Range    Glucose 135 (H) 65 - 99 mg/dL    BUN 15 8 - 23 mg/dL    Creatinine 0.92 0.57 - 1.00 mg/dL    Sodium 139 136 - 145 mmol/L    Potassium 3.3 (L) 3.5 - 5.2 mmol/L    Chloride 97 (L) 98 - 107 mmol/L    CO2 28.0 22.0 - 29.0 mmol/L    Calcium 9.7 8.6 - 10.5 mg/dL    BUN/Creatinine Ratio 16.3 7.0 - 25.0    Anion Gap 14.0 5.0 - 15.0 mmol/L    eGFR 63.1 >60.0 mL/min/1.73   Sedimentation Rate    Specimen: Blood   Result Value Ref Range    Sed Rate 46 (H) 0 - 30 mm/hr   C-reactive Protein    Specimen: Blood   Result Value Ref Range    C-Reactive Protein 0.48 0.00 - 0.50 mg/dL   CBC Auto Differential    Specimen: Blood   Result Value Ref Range    WBC 9.00 3.40 - 10.80 10*3/mm3    RBC 3.71 (L) 3.77 - 5.28 10*6/mm3    Hemoglobin 10.7 (L) 12.0 - 15.9 g/dL    Hematocrit 32.3 (L) 34.0 - 46.6 %    MCV 87.2 79.0 - 97.0 fL    MCH 28.9 26.6 - 33.0 pg    MCHC 33.2 31.5 - 35.7 g/dL    RDW 15.5 (H) 12.3 - 15.4 %    RDW-SD 47.3 37.0 - 54.0 fl    MPV 8.0 6.0 - 12.0 fL    Platelets 318 140 - 450 10*3/mm3    Neutrophil % 71.3 42.7 - 76.0 %    Lymphocyte % 16.4 (L) 19.6 - 45.3 %    Monocyte % 8.8 5.0 - 12.0 %    Eosinophil % 2.4 0.3 - 6.2 %    Basophil % 1.1 0.0 - 1.5 %    Neutrophils, Absolute 6.40 1.70 - 7.00 10*3/mm3     Lymphocytes, Absolute 1.50 0.70 - 3.10 10*3/mm3    Monocytes, Absolute 0.80 0.10 - 0.90 10*3/mm3    Eosinophils, Absolute 0.20 0.00 - 0.40 10*3/mm3    Basophils, Absolute 0.10 0.00 - 0.20 10*3/mm3    nRBC 0.0 0.0 - 0.2 /100 WBC     CT Head Without Contrast    Result Date: 9/10/2022  1.  No acute intracranial abnormality. Electronically signed by:  Louie Spencer DO  9/10/2022 2:19 AM                                      MDM  Patient presents with a gradual onset headache.  Differential diagnosis including subarachnoid hemorrhage, meningitis, vasculitis, mastoiditis.  She does have some elevated blood pressure but states that her blood pressure was normal at home before she came to the emergency room.  She has no focal deficit to suggest stroke.  She is afebrile and has no leukocytosis to suggest meningitis or encephalitis.  Her CRP is normal, vasculitis felt to be unlikely.  CT scan of the head shows no acute intracranial abnormality.  She was ordered Compazine and Benadryl and had marginal improvement in discomfort.  She was ordered some Dilaudid for additional pain management.  She has an ongoing normal neurologic exam and is discharged in good condition she is encouraged to follow-up with her primary care physician on Monday for recheck and was given warning signs for return.  Final diagnoses:   Nonintractable headache, unspecified chronicity pattern, unspecified headache type       ED Disposition  ED Disposition     ED Disposition   Discharge    Condition   Stable    Comment   --             Tito Brito MD  [address] [address]  [phone]    Schedule an appointment as soon as possible for a visit            Medication List      No changes were made to your prescriptions during this visit.          Vipin Shah MD  09/10/22 0509       Vipin Shah MD  09/10/22 0506

## 2022-09-10 NOTE — DISCHARGE INSTRUCTIONS
Rest, Tylenol or Motrin for discomfort.  Follow-up with your doctor on Monday for recheck including recheck blood pressure and neuro exam.  Return for increased pain, fever, blurry vision, numbness, weakness or any other concerns

## 2025-02-25 PROCEDURE — 88305 TISSUE EXAM BY PATHOLOGIST: CPT | Performed by: UROLOGY

## 2025-02-26 ENCOUNTER — LAB REQUISITION (OUTPATIENT)
Dept: LAB | Facility: HOSPITAL | Age: 85
End: 2025-02-26
Payer: MEDICARE

## 2025-02-26 DIAGNOSIS — N32.9 BLADDER DISORDER, UNSPECIFIED: ICD-10-CM

## 2025-02-27 LAB
LAB AP CASE REPORT: NORMAL
PATH REPORT.FINAL DX SPEC: NORMAL
PATH REPORT.GROSS SPEC: NORMAL